# Patient Record
Sex: MALE | Race: WHITE | NOT HISPANIC OR LATINO | Employment: OTHER | URBAN - METROPOLITAN AREA
[De-identification: names, ages, dates, MRNs, and addresses within clinical notes are randomized per-mention and may not be internally consistent; named-entity substitution may affect disease eponyms.]

---

## 2022-02-17 ENCOUNTER — TELEPHONE (OUTPATIENT)
Dept: GASTROENTEROLOGY | Facility: CLINIC | Age: 87
End: 2022-02-17

## 2022-02-17 DIAGNOSIS — K20.90 ESOPHAGITIS DETERMINED BY ENDOSCOPY: Primary | ICD-10-CM

## 2022-02-17 RX ORDER — PANTOPRAZOLE SODIUM 40 MG/1
40 TABLET, DELAYED RELEASE ORAL 2 TIMES DAILY
Qty: 60 TABLET | Refills: 1 | Status: SHIPPED | OUTPATIENT
Start: 2022-02-17 | End: 2022-02-17 | Stop reason: ALTCHOICE

## 2022-02-17 RX ORDER — SUCRALFATE 1 G/1
1 TABLET ORAL
Qty: 120 TABLET | Refills: 1 | Status: SHIPPED | OUTPATIENT
Start: 2022-02-17 | End: 2022-06-09

## 2022-02-17 NOTE — TELEPHONE ENCOUNTER
Pt's wife, Ashlee Gavin, states he was taken to [facility] ER last night/had esophageal surgery w/ Dr Elaina Arias; asks to spk w/ a nurse; has med & diet ques  CB# [phone]

## 2022-02-17 NOTE — TELEPHONE ENCOUNTER
Patient seen in [facility] ER last night for esophageal food impaction  Significant esophagitis with tight GE junction stricture  Patient on Plavix for history of CVA  Patient states that he has an appointment already scheduled with Dr Brent Shaw  Can we confirmed that? Also, please scan and [place] records, including GI consult and EGD report  Please confirm patient's pharmacy and prescribe pantoprazole 40 mg p o  B i d   And Carafate 1 g p o  Q i d  ([place] prescriptions were not working last night )

## 2022-02-17 NOTE — TELEPHONE ENCOUNTER
I cancelled pantoprazole Rx and instructed pt to use the lansoprazole 30 mg BID (open capsule sprinkle on applesauce)  Meds can be addressed at visit tomorrow

## 2022-02-18 ENCOUNTER — TELEPHONE (OUTPATIENT)
Dept: GASTROENTEROLOGY | Facility: CLINIC | Age: 87
End: 2022-02-18

## 2022-02-18 ENCOUNTER — OFFICE VISIT (OUTPATIENT)
Dept: GASTROENTEROLOGY | Facility: CLINIC | Age: 87
End: 2022-02-18
Payer: MEDICARE

## 2022-02-18 VITALS
BODY MASS INDEX: 25.68 KG/M2 | SYSTOLIC BLOOD PRESSURE: 154 MMHG | DIASTOLIC BLOOD PRESSURE: 74 MMHG | HEIGHT: 67 IN | WEIGHT: 163.6 LBS

## 2022-02-18 DIAGNOSIS — I63.9 CEREBROVASCULAR ACCIDENT (CVA), UNSPECIFIED MECHANISM (HCC): ICD-10-CM

## 2022-02-18 DIAGNOSIS — Z79.01 CHRONIC ANTICOAGULATION: ICD-10-CM

## 2022-02-18 DIAGNOSIS — Z86.2 HISTORY OF IRON DEFICIENCY ANEMIA: ICD-10-CM

## 2022-02-18 DIAGNOSIS — K22.2 ESOPHAGEAL STRICTURE: Primary | ICD-10-CM

## 2022-02-18 PROCEDURE — 99214 OFFICE O/P EST MOD 30 MIN: CPT | Performed by: INTERNAL MEDICINE

## 2022-02-18 RX ORDER — LANSOPRAZOLE 30 MG/1
30 CAPSULE, DELAYED RELEASE ORAL DAILY
COMMUNITY
Start: 2021-12-02 | End: 2022-06-09

## 2022-02-18 RX ORDER — MULTIVIT-MIN/IRON FUM/FOLIC AC 7.5 MG-4
1 TABLET ORAL DAILY
COMMUNITY

## 2022-02-18 RX ORDER — CLOPIDOGREL BISULFATE 75 MG/1
75 TABLET ORAL DAILY
COMMUNITY
Start: 2022-01-06

## 2022-02-18 RX ORDER — NIACIN 1000 MG/1
1000 TABLET, EXTENDED RELEASE ORAL
COMMUNITY

## 2022-02-18 RX ORDER — RAMIPRIL 5 MG/1
5 CAPSULE ORAL DAILY
COMMUNITY
Start: 2021-11-18

## 2022-02-18 NOTE — TELEPHONE ENCOUNTER
Scheduled date of EGD(as of today):03/22/2022  Physician performing EGD:Dr Gaye Sanders  Location of EGD:[facility]  Instructions reviewed with patient by:Kristina Cobos  Clearances:  Yes

## 2022-02-18 NOTE — LETTER
February 18, 2022     Minda Stack MD  [address] [facility] [address]    Patient: Shaina Alamo   YOB: 1935   Date of Visit: 2/18/2022       Dear Dr Jimmy Sapp:    Thank you for referring Kendrick Jett to me for evaluation  Below are my notes for this consultation  If you have questions, please do not hesitate to call me  I look forward to following your patient along with you  Sincerely,        Anabel Smith MD        CC: No Recipients  Anabel Smith MD  2/18/2022  4:01 PM  Sign when Signing Visit  [address] [facility] - Outpatient Follow-up Note  Shaina Alamo 80 y o  male MRN: [id number]  Encounter: [id number]    ASSESSMENT AND PLAN:      1  Esophageal stricture  Patient in ER with meat impaction secondary to esophageal stricture  Meat impaction removed by Dr VAUGHAN [facility] but dilation not performed secondary to the prolonged endoscopy to remove the meat impaction and the fact the patient was on Plavix  Seems to be tolerating liquid diet currently  - continue liquid diet but can advance to some soft foods  - continue Prevacid  - continue Carafate for a week and then discontinue  - EGD with balloon dilation at hospital after being off Plavix for 5 days    2  Cerebrovascular accident (CVA), unspecified mechanism ([address] )  3  Chronic anticoagulation  Has held Plavix for 5 days prior to interventions in the past  - will hold Plavix for 5 days prior to EGD and dilation if okay with PCP  Patient understands the potential thromboembolic risks    4  History of iron deficiency anemia  Chronic issue  Last colonoscopy over 10 years ago  On chronic iron    Hemoglobin normal in the ER earlier this week  - continue oral iron      Followup Appointment:  After EGD  ______________________________________________________________________    Chief Complaint   Patient presents with    Follow up [facility] dysphagia     HPI:  The patient is seen back in the office today related to esophageal stricture and a meat impaction  The patient was seen by Dr Isha Lopes 2 nights ago when he presented to the emergency room with a meat impaction  There was significant amount of food in the esophagus in after a prolonged endoscopy this was cleared  There was a distal esophageal stricture that was described as tight  Scope could not be passed and related to the prolonged impaction and the fact the patient was on Plavix IA dilation was not attempted  The patient was discharged on Prevacid and Carafate and follow-up appointment was arranged to coordinate repeat endoscopy with dilation  Patient is currently tolerating his liquid diet without issues  He denies any heartburn or indigestion  Denies any chest or abdominal pain  His weight is stable    He denies any change in his bowels    Historical Information   Past Medical History:   Diagnosis Date    Hypertension     Stroke [address] [address] )     14 years ago     Past Surgical History:   Procedure Laterality Date    APPENDECTOMY      COLONOSCOPY      HIP SURGERY Left     TONSILLECTOMY AND ADENOIDECTOMY       Social History     Substance and Sexual Activity   Alcohol Use Yes    Comment: socially     Social History     Substance and Sexual Activity   Drug Use Not on file     Social History     Tobacco Use   Smoking Status Former Smoker   Smokeless Tobacco Never Used     Family History   Problem Relation Age of Onset    Colon cancer Neg Hx     Colon polyps Neg Hx          Current Outpatient Medications:     clopidogrel (PLAVIX) 75 mg tablet    FERROUS SULFATE PO    GARLIC PO    lansoprazole (PREVACID) 30 mg capsule    Multiple Vitamins-Minerals (multivitamin with minerals) tablet    niacin (NIASPAN) 1000 MG CR tablet    ramipril (ALTACE) 5 mg capsule    sucralfate (CARAFATE) 1 g tablet  No Known Allergies  Reviewed medications and allergies and updated as indicated    PHYSICAL EXAM:    Blood pressure 154/74, height 5' 7" (1 702 m), weight 74 2 kg (163 lb 9 6 oz)  Body mass index is 25 62 kg/m²  General Appearance: NAD, cooperative, alert  Eyes: Anicteric, PERRLA, EOMI  ENT:  Normocephalic, atraumatic, normal mucosa  Neck:  Supple, symmetrical, trachea midline  Resp:  Clear to auscultation bilaterally; no rales, rhonchi or wheezing; respirations unlabored   CV:  S1 S2, Regular rate and rhythm; no murmur, rub, or gallop  GI:  Soft, non-tender, non-distended; normal bowel sounds; no masses, no organomegaly   Rectal: Deferred  Musculoskeletal: No cyanosis, clubbing or edema  Normal ROM  Skin:  No jaundice, rashes, or lesions   Heme/Lymph: No palpable cervical lymphadenopathy  Psych: Normal affect, good eye contact  Neuro: No gross deficits, AAOx3    Lab Results:   Hemoglobin 13 2, hematocrit 40 7, platelet count 384, white count 11 3   Sodium 138, potassium 4 2, chloride 99, bicarb 28, BUN 18, creatinine 0 8, glucose 117  (2/17/22)    Radiology Results:   No results found

## 2022-02-18 NOTE — PROGRESS NOTES
[facility] - Outpatient Follow-up Note  Jennifer Rogers 80 y o  male MRN: [id number]  Encounter: [id number]    ASSESSMENT AND PLAN:      1  Esophageal stricture  Patient in ER with meat impaction secondary to esophageal stricture  Meat impaction removed by Dr Kali browning but dilation not performed secondary to the prolonged endoscopy to remove the meat impaction and the fact the patient was on Plavix  Seems to be tolerating liquid diet currently  - continue liquid diet but can advance to some soft foods  - continue Prevacid  - continue Carafate for a week and then discontinue  - EGD with balloon dilation at hospital after being off Plavix for 5 days    2  Cerebrovascular accident (CVA), unspecified mechanism ([facility] [address] )  3  Chronic anticoagulation  Has held Plavix for 5 days prior to interventions in the past  - will hold Plavix for 5 days prior to EGD and dilation if okay with PCP  Patient understands the potential thromboembolic risks    4  History of iron deficiency anemia  Chronic issue  Last colonoscopy over 10 years ago  On chronic iron  Hemoglobin normal in the ER earlier this week  - continue oral iron      Followup Appointment:  After EGD  ______________________________________________________________________    Chief Complaint   Patient presents with    Follow up [facility] dysphagia     HPI:  The patient is seen back in the office today related to esophageal stricture and a meat impaction  The patient was seen by Dr Kali browning 2 nights ago when he presented to the emergency room with a meat impaction  There was significant amount of food in the esophagus in after a prolonged endoscopy this was cleared  There was a distal esophageal stricture that was described as tight  Scope could not be passed and related to the prolonged impaction and the fact the patient was on Plavix IA dilation was not attempted    The patient was discharged on Prevacid and Carafate and follow-up appointment was arranged to coordinate repeat endoscopy with dilation  Patient is currently tolerating his liquid diet without issues  He denies any heartburn or indigestion  Denies any chest or abdominal pain  His weight is stable  He denies any change in his bowels    Historical Information   Past Medical History:   Diagnosis Date    Hypertension     Stroke ([address] )     14 years ago     Past Surgical History:   Procedure Laterality Date    APPENDECTOMY      COLONOSCOPY      HIP SURGERY Left     TONSILLECTOMY AND ADENOIDECTOMY       Social History     Substance and Sexual Activity   Alcohol Use Yes    Comment: socially     Social History     Substance and Sexual Activity   Drug Use Not on file     Social History     Tobacco Use   Smoking Status Former Smoker   Smokeless Tobacco Never Used     Family History   Problem Relation Age of Onset    Colon cancer Neg Hx     Colon polyps Neg Hx          Current Outpatient Medications:     clopidogrel (PLAVIX) 75 mg tablet    FERROUS SULFATE PO    GARLIC PO    lansoprazole (PREVACID) 30 mg capsule    Multiple Vitamins-Minerals (multivitamin with minerals) tablet    niacin (NIASPAN) 1000 MG CR tablet    ramipril (ALTACE) 5 mg capsule    sucralfate (CARAFATE) 1 g tablet  No Known Allergies  Reviewed medications and allergies and updated as indicated    PHYSICAL EXAM:    Blood pressure 154/74, height 5' 7" (1 702 m), weight 74 2 kg (163 lb 9 6 oz)  Body mass index is 25 62 kg/m²  General Appearance: NAD, cooperative, alert  Eyes: Anicteric, PERRLA, EOMI  ENT:  Normocephalic, atraumatic, normal mucosa  Neck:  Supple, symmetrical, trachea midline  Resp:  Clear to auscultation bilaterally; no rales, rhonchi or wheezing; respirations unlabored   CV:  S1 S2, Regular rate and rhythm; no murmur, rub, or gallop  GI:  Soft, non-tender, non-distended; normal bowel sounds; no masses, no organomegaly   Rectal: Deferred  Musculoskeletal: No cyanosis, clubbing or edema   Normal ROM   Skin:  No jaundice, rashes, or lesions   Heme/Lymph: No palpable cervical lymphadenopathy  Psych: Normal affect, good eye contact  Neuro: No gross deficits, AAOx3    Lab Results:   Hemoglobin 13 2, hematocrit 40 7, platelet count 743, white count 11 3   Sodium 138, potassium 4 2, chloride 99, bicarb 28, BUN 18, creatinine 0 8, glucose 117  (2/17/22)    Radiology Results:   No results found

## 2022-02-18 NOTE — PATIENT INSTRUCTIONS
Continue Prevacid once daily  Continue Carafate 4 times a day for 2 weeks and then stop  Can try to advance your diet slightly but avoid raw fruits, vegetables, and meat in big pieces  Will arrange EGD with dilation off of the Plavix

## 2022-02-25 ENCOUNTER — TELEPHONE (OUTPATIENT)
Dept: GASTROENTEROLOGY | Facility: CLINIC | Age: 87
End: 2022-02-25

## 2022-02-25 NOTE — TELEPHONE ENCOUNTER
Pt is wondering if the carafate prescribed by Dr Gaye Sanders after food removal would cause "semi diarrheal condition"  I see from [address] notes that pt was supposed to stop the carafate after a certain time  Can you please address? Thank you    Call back # [phone]

## 2022-03-31 ENCOUNTER — TELEPHONE (OUTPATIENT)
Dept: GASTROENTEROLOGY | Facility: CLINIC | Age: 87
End: 2022-03-31

## 2022-06-09 ENCOUNTER — TELEPHONE (OUTPATIENT)
Dept: GASTROENTEROLOGY | Facility: CLINIC | Age: 87
End: 2022-06-09

## 2022-06-09 ENCOUNTER — OFFICE VISIT (OUTPATIENT)
Dept: GASTROENTEROLOGY | Facility: CLINIC | Age: 87
End: 2022-06-09
Payer: MEDICARE

## 2022-06-09 VITALS
WEIGHT: 163 LBS | HEIGHT: 67 IN | DIASTOLIC BLOOD PRESSURE: 76 MMHG | BODY MASS INDEX: 25.58 KG/M2 | SYSTOLIC BLOOD PRESSURE: 118 MMHG | HEART RATE: 68 BPM

## 2022-06-09 DIAGNOSIS — K22.2 ESOPHAGEAL STRICTURE: Primary | ICD-10-CM

## 2022-06-09 DIAGNOSIS — Z79.01 CHRONIC ANTICOAGULATION: ICD-10-CM

## 2022-06-09 PROCEDURE — 99214 OFFICE O/P EST MOD 30 MIN: CPT | Performed by: INTERNAL MEDICINE

## 2022-06-09 RX ORDER — ASCORBIC ACID 500 MG
500 TABLET ORAL DAILY
COMMUNITY

## 2022-06-09 RX ORDER — B-COMPLEX WITH VITAMIN C
TABLET ORAL DAILY
COMMUNITY

## 2022-06-09 RX ORDER — OMEPRAZOLE 40 MG/1
40 CAPSULE, DELAYED RELEASE ORAL DAILY
Qty: 30 CAPSULE | Refills: 12 | Status: SHIPPED | OUTPATIENT
Start: 2022-06-09 | End: 2022-07-03

## 2022-06-09 NOTE — LETTER
June 9, 2022     Marah Ware MD  [address] [facility] [address]    Patient: Alfonzo Schumacher   YOB: 1935   Date of Visit: 6/9/2022       Dear Dr Stephanie Cerda:    Thank you for referring Muna Partida to me for evaluation  Below are my notes for this consultation  If you have questions, please do not hesitate to call me  I look forward to following your patient along with you  Sincerely,        Dannie Harman MD        CC: No Recipients  Dannie Harman MD  6/9/2022  9:59 AM  Sign when Signing Visit  [facility] - Outpatient Follow-up Note  Alfonzo Schumacher 80 y o  male MRN: [id number]  Encounter: [id number]    ASSESSMENT AND PLAN:      1  Esophageal stricture  H/O esophageal food food impaction February 2022  Follow-up EGD with balloon dilation March 2022  Was on Prevacid transiently but stop secondary to GI distress  Now with increasing dysphagia  - omeprazole 40 mg daily  - EGD with dilation at [place]    2  History of CVA on chronic Plavix  Will hold the Plavix for 5 days prior to the procedure  Patient understands the potential thromboembolic risks      Followup Appointment:  4 months  ______________________________________________________________________    Chief Complaint   Patient presents with    Follow up to EGD, dysphagia     HPI:  The patient is seen back in the office today  He presented in February with a acute food impaction requiring emergent endoscopy and removal   He was last seen the end of March when he had a repeat EGD with balloon dilation of the benign esophageal strictures  He was started on Prevacid and Carafate  It is unclear how long he took the Carafate  He reports that the Prevacid was making him nauseous so he stopped it  He reports over the last month he has had recurrent episodes of solid food dysphagia  He does not think he is cutting his meat into small enough pieces    This is been occurring about once a week  He is able to wash the food down with water and has not needed to regurgitate food or go to the emergency room  He denies any heartburn or chest pain  He does report to bringing up a lot of mucus  Historical Information   Past Medical History:   Diagnosis Date    Hypertension     Stroke ([address] )     14 years ago     Past Surgical History:   Procedure Laterality Date    APPENDECTOMY      COLONOSCOPY      HIP SURGERY Left     TONSILLECTOMY AND ADENOIDECTOMY       Social History     Substance and Sexual Activity   Alcohol Use Yes    Comment: socially     Social History     Substance and Sexual Activity   Drug Use Not on file     Social History     Tobacco Use   Smoking Status Former Smoker   Smokeless Tobacco Never Used     Family History   Problem Relation Age of Onset    Colon cancer Neg Hx     Colon polyps Neg Hx          Current Outpatient Medications:     ascorbic acid (VITAMIN C) 500 MG tablet    Calcium Carbonate-Vitamin D (Calcium 600+D) 600-200 MG-UNIT TABS    clopidogrel (PLAVIX) 75 mg tablet    FERROUS SULFATE PO    GARLIC PO    Multiple Vitamins-Minerals (multivitamin with minerals) tablet    niacin (NIASPAN) 1000 MG CR tablet    ramipril (ALTACE) 5 mg capsule    lansoprazole (PREVACID) 30 mg capsule    sucralfate (CARAFATE) 1 g tablet  No Known Allergies  Reviewed medications and allergies and updated as indicated    PHYSICAL EXAM:    Blood pressure 118/76, pulse 68, height 5' 7" (1 702 m), weight 73 9 kg (163 lb)  Body mass index is 25 53 kg/m²  General Appearance: NAD, cooperative, alert  Eyes: Anicteric, PERRLA, EOMI  ENT:  Normocephalic, atraumatic, normal mucosa  Neck:  Supple, symmetrical, trachea midline  Resp:  Clear to auscultation bilaterally; no rales, rhonchi or wheezing; respirations unlabored   CV:  S1 S2, Regular rate and rhythm; no murmur, rub, or gallop    GI:  Soft, non-tender, non-distended; normal bowel sounds; no masses, no organomegaly Rectal: Deferred  Musculoskeletal: No cyanosis, clubbing or edema  Normal ROM  Skin:  No jaundice, rashes, or lesions   Heme/Lymph: No palpable cervical lymphadenopathy  Psych: Normal affect, good eye contact  Neuro: No gross deficits, AAOx3    Lab Results:   None recently    Radiology Results:   No results found

## 2022-06-09 NOTE — TELEPHONE ENCOUNTER
Scheduled date of EGD(as of today): 7/26/22  Physician performing EGD: Dr Claudia Fuentes  Location of EGD: [facility]  Instructions reviewed with patient by: Last Lowry  Clearances: yes (patient is to stop plavix 5 days prior to procedure per Dr Claudia Fuentes)

## 2022-06-09 NOTE — PROGRESS NOTES
[facility] - Outpatient Follow-up Note  Anayeli Galindo 80 y o  male MRN: [id number]  Encounter: [id number]    ASSESSMENT AND PLAN:      1  Esophageal stricture  H/O esophageal food food impaction February 2022  Follow-up EGD with balloon dilation March 2022  Was on Prevacid transiently but stop secondary to GI distress  Now with increasing dysphagia  - omeprazole 40 mg daily  - EGD with dilation at [place]    2  History of CVA on chronic Plavix  Will hold the Plavix for 5 days prior to the procedure  Patient understands the potential thromboembolic risks      Followup Appointment:  4 months  ______________________________________________________________________    Chief Complaint   Patient presents with    Follow up to EGD, dysphagia     HPI:  The patient is seen back in the office today  He presented in February with a acute food impaction requiring emergent endoscopy and removal   He was last seen the end of March when he had a repeat EGD with balloon dilation of the benign esophageal strictures  He was started on Prevacid and Carafate  It is unclear how long he took the Carafate  He reports that the Prevacid was making him nauseous so he stopped it  He reports over the last month he has had recurrent episodes of solid food dysphagia  He does not think he is cutting his meat into small enough pieces  This is been occurring about once a week  He is able to wash the food down with water and has not needed to regurgitate food or go to the emergency room  He denies any heartburn or chest pain  He does report to bringing up a lot of mucus      Historical Information   Past Medical History:   Diagnosis Date    Hypertension     Stroke ([address] )     14 years ago     Past Surgical History:   Procedure Laterality Date    APPENDECTOMY      COLONOSCOPY      HIP SURGERY Left     TONSILLECTOMY AND ADENOIDECTOMY       Social History     Substance and Sexual Activity Alcohol Use Yes    Comment: socially     Social History     Substance and Sexual Activity   Drug Use Not on file     Social History     Tobacco Use   Smoking Status Former Smoker   Smokeless Tobacco Never Used     Family History   Problem Relation Age of Onset    Colon cancer Neg Hx     Colon polyps Neg Hx          Current Outpatient Medications:     ascorbic acid (VITAMIN C) 500 MG tablet    Calcium Carbonate-Vitamin D (Calcium 600+D) 600-200 MG-UNIT TABS    clopidogrel (PLAVIX) 75 mg tablet    FERROUS SULFATE PO    GARLIC PO    Multiple Vitamins-Minerals (multivitamin with minerals) tablet    niacin (NIASPAN) 1000 MG CR tablet    ramipril (ALTACE) 5 mg capsule    lansoprazole (PREVACID) 30 mg capsule    sucralfate (CARAFATE) 1 g tablet  No Known Allergies  Reviewed medications and allergies and updated as indicated    PHYSICAL EXAM:    Blood pressure 118/76, pulse 68, height 5' 7" (1 702 m), weight 73 9 kg (163 lb)  Body mass index is 25 53 kg/m²  General Appearance: NAD, cooperative, alert  Eyes: Anicteric, PERRLA, EOMI  ENT:  Normocephalic, atraumatic, normal mucosa  Neck:  Supple, symmetrical, trachea midline  Resp:  Clear to auscultation bilaterally; no rales, rhonchi or wheezing; respirations unlabored   CV:  S1 S2, Regular rate and rhythm; no murmur, rub, or gallop  GI:  Soft, non-tender, non-distended; normal bowel sounds; no masses, no organomegaly   Rectal: Deferred  Musculoskeletal: No cyanosis, clubbing or edema  Normal ROM  Skin:  No jaundice, rashes, or lesions   Heme/Lymph: No palpable cervical lymphadenopathy  Psych: Normal affect, good eye contact  Neuro: No gross deficits, AAOx3    Lab Results:   None recently    Radiology Results:   No results found

## 2022-07-03 DIAGNOSIS — K22.2 ESOPHAGEAL STRICTURE: ICD-10-CM

## 2022-07-03 RX ORDER — OMEPRAZOLE 40 MG/1
40 CAPSULE, DELAYED RELEASE ORAL DAILY
Qty: 90 CAPSULE | Refills: 5 | Status: SHIPPED | OUTPATIENT
Start: 2022-07-03

## 2022-07-26 ENCOUNTER — TELEPHONE (OUTPATIENT)
Dept: GASTROENTEROLOGY | Facility: CLINIC | Age: 87
End: 2022-07-26

## 2022-07-26 NOTE — TELEPHONE ENCOUNTER
Dr Fortunato Beatty cancelled egd that was ordered from last ov  Pt is not going to reschedule at this time   Everything is ok

## 2022-10-17 ENCOUNTER — TELEPHONE (OUTPATIENT)
Dept: GASTROENTEROLOGY | Facility: CLINIC | Age: 87
End: 2022-10-17

## 2022-10-17 NOTE — TELEPHONE ENCOUNTER
Patients GI provider:  Dr Bisi Reynaga    Number to return call: [phone]    Reason for call: Pt calling to reschedule procedure  Pt states this is to stretch is esophagus       Scheduled procedure/appointment date if applicable: N/A

## 2022-10-18 NOTE — TELEPHONE ENCOUNTER
Dr Bossman Donald was ordered egd from  in [facility] did not schedule  Pt stated when I called to schedule him that everything was ok and he did not want to schedule  Pt is now calling to schedule-ok to schedule?

## 2022-10-19 NOTE — TELEPHONE ENCOUNTER
Joan   [facility]    Thank you

## 2022-11-18 ENCOUNTER — TELEPHONE (OUTPATIENT)
Dept: GASTROENTEROLOGY | Facility: CLINIC | Age: 87
End: 2022-11-18

## 2022-11-18 NOTE — TELEPHONE ENCOUNTER
Scheduled date of EGD(as of today):12/06/2022  Physician performing EGD: Dr VAUGHAN [facility]  Location of EGD: [facility]  Instructions reviewed and mailed to patient by: SERVANDO  Clearances: yes-plavix

## 2022-11-18 NOTE — TELEPHONE ENCOUNTER
Patient was seen at [facility] 11/17/2022 and an EGD was performed  Per Dr Gladys Casper, patient needs a 2-3 week follow up at [facility]  Patient is scheduled 12/06/2022  He will need clearance for Plavix from Dr Leslye Child

## 2022-11-23 ENCOUNTER — TELEPHONE (OUTPATIENT)
Dept: GASTROENTEROLOGY | Facility: CLINIC | Age: 87
End: 2022-11-23

## 2022-11-23 NOTE — TELEPHONE ENCOUNTER
Spoke with patient-told him his last dose of Plavix is 11/30-told him we have to send clearance to Dr Laboy Loop again  Will let him know of any changes  Scheduled date of EGD(as of today): 12/6  Physician performing EGD: Dr De  Location of EGD: [facility]  Patient is okay with instructions    Clearances: yes

## 2023-02-14 DIAGNOSIS — K22.2 ESOPHAGEAL STRICTURE: Primary | ICD-10-CM

## 2023-02-14 RX ORDER — FAMOTIDINE 40 MG/1
TABLET, FILM COATED ORAL
Qty: 60 TABLET | Refills: 2 | Status: SHIPPED | OUTPATIENT
Start: 2023-02-14

## 2023-03-08 DIAGNOSIS — K22.2 ESOPHAGEAL STRICTURE: ICD-10-CM

## 2023-03-08 RX ORDER — FAMOTIDINE 40 MG/1
TABLET, FILM COATED ORAL
Qty: 180 TABLET | Refills: 1 | Status: SHIPPED | OUTPATIENT
Start: 2023-03-08

## 2023-03-13 ENCOUNTER — OFFICE VISIT (OUTPATIENT)
Dept: GASTROENTEROLOGY | Facility: CLINIC | Age: 88
End: 2023-03-13

## 2023-03-13 VITALS
HEIGHT: 67 IN | SYSTOLIC BLOOD PRESSURE: 114 MMHG | WEIGHT: 164.6 LBS | BODY MASS INDEX: 25.83 KG/M2 | DIASTOLIC BLOOD PRESSURE: 72 MMHG

## 2023-03-13 DIAGNOSIS — K22.2 ESOPHAGEAL STRICTURE: Primary | ICD-10-CM

## 2023-03-13 DIAGNOSIS — R13.13 PHARYNGEAL DYSPHAGIA: ICD-10-CM

## 2023-03-13 NOTE — LETTER
March 13, 2023     Angelo Arana MD  [address] [facility] [address]    Patient: Ran Montero   YOB: 1935   Date of Visit: 3/13/2023       Dear Dr Ja Felton:    Thank you for referring Sole Jolly to me for evaluation  Below are my notes for this consultation  If you have questions, please do not hesitate to call me  I look forward to following your patient along with you  Sincerely,        Ana Byrnes MD        CC: No Recipients  Ana Byrnes MD  3/13/2023 11:31 AM  Sign when Signing Visit  [facility] - Outpatient Follow-up Note  Ran Montero 80 y o  male MRN: [id number]  Encounter: [id number]    ASSESSMENT AND PLAN:      1  Esophageal stricture  2  Pharyngeal dysphagia  Swallowing better with only rare episode of dysphagia when eats too big a piece of meat  -Continue Pepcid  -Follow for now  -If he has increasing issues with dysphagia he will let me know we will arrange EGD with dilation off of Plavix      Followup Appointment: As needed  ______________________________________________________________________    Chief Complaint   Patient presents with   • Follow EGD     HPI: The patient is seen in the office today  He has a history of a stroke on Plavix and was seen November 2022 for a food impaction  There was a distal esophageal stricture but no dilation was performed secondary to irritation and the Plavix  He returned in December off of Plavix and had an EGD with biopsies  Since that time has been swallowing better  He reports an occasional episode of dysphagia when he eats too big a piece of meat  He reports he is able to eventually wash it down with water  Denies any heartburn or indigestion  He is taking Pepcid twice a day  He has been intolerant of PPIs in the past   Denies any heartburn or indigestion  Denies any chest or abdominal pain  His weight is stable  He is moving his bowels regularly    He denies any GI bleeding  Historical Information   Past Medical History:   Diagnosis Date   • Hypertension    • Stroke ([address] )     14 years ago     Past Surgical History:   Procedure Laterality Date   • APPENDECTOMY     • COLONOSCOPY     • HIP SURGERY Left    • TONSILLECTOMY AND ADENOIDECTOMY       Social History     Substance and Sexual Activity   Alcohol Use Yes    Comment: socially     Social History     Substance and Sexual Activity   Drug Use Not on file     Social History     Tobacco Use   Smoking Status Former   Smokeless Tobacco Never     Family History   Problem Relation Age of Onset   • Colon cancer Neg Hx    • Colon polyps Neg Hx          Current Outpatient Medications:   •  ascorbic acid (VITAMIN C) 500 MG tablet  •  Calcium Carbonate-Vitamin D 600-200 MG-UNIT TABS  •  clopidogrel (PLAVIX) 75 mg tablet  •  famotidine (PEPCID) 40 MG tablet  •  GARLIC PO  •  Misc Natural Products ([employer] GLUCOS-CHONDROIT-MSM TS PO)  •  Multiple Vitamins-Minerals (multivitamin with minerals) tablet  •  niacin (NIASPAN) 1000 MG CR tablet  •  ramipril (ALTACE) 5 mg capsule  •  FERROUS SULFATE PO  No Known Allergies  Reviewed medications and allergies and updated as indicated    PHYSICAL EXAM:    Blood pressure 114/72, height 5' 7" (1 702 m), weight 74 7 kg (164 lb 9 6 oz)  Body mass index is 25 78 kg/m²  General Appearance: NAD, cooperative, alert  Eyes: Anicteric, PERRLA, EOMI  ENT:  Normocephalic, atraumatic, normal mucosa  Neck:  Supple, symmetrical, trachea midline  Resp:  Clear to auscultation bilaterally; no rales, rhonchi or wheezing; respirations unlabored   CV:  S1 S2, Regular rate and rhythm; no murmur, rub, or gallop  GI:  Soft, non-tender, non-distended; normal bowel sounds; no masses, no organomegaly   Rectal: Deferred  Musculoskeletal: No cyanosis, clubbing or edema  Normal ROM    Skin:  No jaundice, rashes, or lesions   Heme/Lymph: No palpable cervical lymphadenopathy  Psych: Normal affect, good eye contact  Neuro: No gross deficits, AAOx3    Lab Results:   None recently    Radiology Results:   No results found

## 2023-03-13 NOTE — PROGRESS NOTES
[facility] - Outpatient Follow-up Note  Freedom Valente 80 y o  male MRN: [id number]  Encounter: [id number]    ASSESSMENT AND PLAN:      1  Esophageal stricture  2  Pharyngeal dysphagia  Swallowing better with only rare episode of dysphagia when eats too big a piece of meat  -Continue Pepcid  -Follow for now  -If he has increasing issues with dysphagia he will let me know we will arrange EGD with dilation off of Plavix      Followup Appointment: As needed  ______________________________________________________________________    Chief Complaint   Patient presents with   • Follow EGD     HPI: The patient is seen in the office today  He has a history of a stroke on Plavix and was seen November 2022 for a food impaction  There was a distal esophageal stricture but no dilation was performed secondary to irritation and the Plavix  He returned in December off of Plavix and had an EGD with biopsies  Since that time has been swallowing better  He reports an occasional episode of dysphagia when he eats too big a piece of meat  He reports he is able to eventually wash it down with water  Denies any heartburn or indigestion  He is taking Pepcid twice a day  He has been intolerant of PPIs in the past   Denies any heartburn or indigestion  Denies any chest or abdominal pain  His weight is stable  He is moving his bowels regularly  He denies any GI bleeding      Historical Information   Past Medical History:   Diagnosis Date   • Hypertension    • Stroke ([facility] [address] )     14 years ago     Past Surgical History:   Procedure Laterality Date   • APPENDECTOMY     • COLONOSCOPY     • HIP SURGERY Left    • TONSILLECTOMY AND ADENOIDECTOMY       Social History     Substance and Sexual Activity   Alcohol Use Yes    Comment: socially     Social History     Substance and Sexual Activity   Drug Use Not on file     Social History     Tobacco Use   Smoking Status Former   Smokeless Tobacco Never Family History   Problem Relation Age of Onset   • Colon cancer Neg Hx    • Colon polyps Neg Hx          Current Outpatient Medications:   •  ascorbic acid (VITAMIN C) 500 MG tablet  •  Calcium Carbonate-Vitamin D 600-200 MG-UNIT TABS  •  clopidogrel (PLAVIX) 75 mg tablet  •  famotidine (PEPCID) 40 MG tablet  •  GARLIC PO  •  Misc Natural Products ([employer] GLUCOS-CHONDROIT-MSM TS PO)  •  Multiple Vitamins-Minerals (multivitamin with minerals) tablet  •  niacin (NIASPAN) 1000 MG CR tablet  •  ramipril (ALTACE) 5 mg capsule  •  FERROUS SULFATE PO  No Known Allergies  Reviewed medications and allergies and updated as indicated    PHYSICAL EXAM:    Blood pressure 114/72, height 5' 7" (1 702 m), weight 74 7 kg (164 lb 9 6 oz)  Body mass index is 25 78 kg/m²  General Appearance: NAD, cooperative, alert  Eyes: Anicteric, PERRLA, EOMI  ENT:  Normocephalic, atraumatic, normal mucosa  Neck:  Supple, symmetrical, trachea midline  Resp:  Clear to auscultation bilaterally; no rales, rhonchi or wheezing; respirations unlabored   CV:  S1 S2, Regular rate and rhythm; no murmur, rub, or gallop  GI:  Soft, non-tender, non-distended; normal bowel sounds; no masses, no organomegaly   Rectal: Deferred  Musculoskeletal: No cyanosis, clubbing or edema  Normal ROM  Skin:  No jaundice, rashes, or lesions   Heme/Lymph: No palpable cervical lymphadenopathy  Psych: Normal affect, good eye contact  Neuro: No gross deficits, AAOx3    Lab Results:   None recently    Radiology Results:   No results found

## 2023-09-14 DIAGNOSIS — K22.2 ESOPHAGEAL STRICTURE: ICD-10-CM

## 2023-09-14 RX ORDER — FAMOTIDINE 40 MG/1
TABLET, FILM COATED ORAL
Qty: 180 TABLET | Refills: 1 | Status: SHIPPED | OUTPATIENT
Start: 2023-09-14